# Patient Record
Sex: FEMALE
[De-identification: names, ages, dates, MRNs, and addresses within clinical notes are randomized per-mention and may not be internally consistent; named-entity substitution may affect disease eponyms.]

---

## 2019-05-16 ENCOUNTER — APPOINTMENT (OUTPATIENT)
Dept: GYNECOLOGIC ONCOLOGY | Facility: CLINIC | Age: 44
End: 2019-05-16
Payer: COMMERCIAL

## 2019-05-16 VITALS
WEIGHT: 213 LBS | OXYGEN SATURATION: 98 % | HEART RATE: 66 BPM | HEIGHT: 67 IN | SYSTOLIC BLOOD PRESSURE: 133 MMHG | BODY MASS INDEX: 33.43 KG/M2 | DIASTOLIC BLOOD PRESSURE: 73 MMHG

## 2019-05-16 DIAGNOSIS — Z78.9 OTHER SPECIFIED HEALTH STATUS: ICD-10-CM

## 2019-05-16 DIAGNOSIS — Z80.3 FAMILY HISTORY OF MALIGNANT NEOPLASM OF BREAST: ICD-10-CM

## 2019-05-16 DIAGNOSIS — Z80.41 FAMILY HISTORY OF MALIGNANT NEOPLASM OF OVARY: ICD-10-CM

## 2019-05-16 PROCEDURE — 93976 VASCULAR STUDY: CPT | Mod: 59

## 2019-05-16 PROCEDURE — 76830 TRANSVAGINAL US NON-OB: CPT | Mod: 59

## 2019-05-16 PROCEDURE — 99205 OFFICE O/P NEW HI 60 MIN: CPT | Mod: 25

## 2019-05-16 PROCEDURE — 76857 US EXAM PELVIC LIMITED: CPT | Mod: 59

## 2019-05-16 NOTE — CHIEF COMPLAINT
[FreeTextEntry1] : [facility]\par \par [facility] [phone] at [address] [place] [id number]\par

## 2019-05-16 NOTE — ASSESSMENT
[FreeTextEntry1] : 44yo with globular left ovary and pelvic pain. Complex ovarian cyst seen on US today. \par \par I discussed with the patient that due to her extensive family history she has a 33% lifetime risk of developing breast cancer which qualifies her to have breast MRI. Will also perform MRI pelvis and obtain LUX to better evaluate her ovarian cyst.

## 2019-05-16 NOTE — END OF VISIT
[FreeTextEntry3] : This note accurately reflects the work and decisions made by me.\par  [FreeTextEntry2] : Written by Yasmin Vera PA-C, acting as scribe for Dr. Vanna Davison.\par

## 2019-05-16 NOTE — HISTORY OF PRESENT ILLNESS
[FreeTextEntry1] : 42yo  LMP 21 reports with referral from Dr. Griffith for c/o pelvic pain off and on for a few weeks now. Patient with US this month revealing globular left ovary and elevated CA-125 at 60. Patient reports history of Ex-lap RO for ovarian cyst in . She reports constipation since she was a child but denies any weight loss or abnormal VB. Patient with extensive family history. Mother with breast cancer at age 44, Maternal Aunt with Breast Cancer at age 40 and sister with Ovarian Cancer at age 19. Pt. is BRCA negative. \par \par LPAP- 2019, normal\par LMammo- 2019, normal\par \par

## 2019-05-16 NOTE — REVIEW OF SYSTEMS
[Negative] : Musculoskeletal [Dysuria] : no dysuria [Hematuria] : no hematuria [Abn Vag Bleeding] : no abnormal vaginal bleeding [Dyspareunia] : no dyspareunia [Vaginal Discharge] : no vaginal discharge [Incontinence] : no incontinence [Bloody Stools] : no bloody stools [Nausea] : no nausea/vomitting [FreeTextEntry4] : left sided pelvic pain [Diarrhea] : no diarrhea [de-identified] : occasional constipation

## 2019-05-16 NOTE — LETTER BODY
[FreeTextEntry2] : Name: CAMILLE EVERETT \par : Aug 29 1975 \par \par Date of Visit: May 16, 2019 \par \par Dear Dr. Griffith\par \par I recently saw our mutual patient, CAMILLE EVERETT , in my office.\par \par Below, please see my findings.\par \par As always, it is my sincere pleasure to have the opportunity to participate in one of your patients' care. Please feel free to contact me with any questions or concerns that you may have regarding her care.\par \par Sincerely yours,\par \par Vanna Davison MD\par

## 2019-05-24 ENCOUNTER — OTHER (OUTPATIENT)
Age: 44
End: 2019-05-24

## 2019-05-28 ENCOUNTER — OTHER (OUTPATIENT)
Age: 44
End: 2019-05-28

## 2019-05-30 ENCOUNTER — APPOINTMENT (OUTPATIENT)
Dept: GYNECOLOGIC ONCOLOGY | Facility: CLINIC | Age: 44
End: 2019-05-30
Payer: COMMERCIAL

## 2019-05-30 LAB
CA 125 (LABCORP): 24.2 U/ML
HE 4: 44.5 PMOL/L
POSTMENOPAUSAL ROMA: 1.41
PREMENOPAUSAL ROMA: 0.59
ROMA COMMENT: NORMAL

## 2019-05-30 PROCEDURE — 99214 OFFICE O/P EST MOD 30 MIN: CPT

## 2019-06-03 NOTE — DISCUSSION/SUMMARY
[FreeTextEntry1] : Based on current evaluation I would not recommend surgical removal of the ovary. She is young and that would mean going into menopause. Discussed increased all cause mortality, cardiovascular mortality, dementia and osteoporosis risks associated with premature surgical menopause. Will wait for MRI results to make definitive plans.. With family history this may be beneficial to reduce her risk of breast cancer. If we are doing surgery I would recommend hysterectomy also, so she can be on estrogen only replacement therapy pending final pathology unless there is an estrogen sensitive cancer, which would make hormone replacement too high risk.

## 2019-06-03 NOTE — ASSESSMENT
[FreeTextEntry1] : 42 yo with a complex left ovarian mass and a normal LUX. She has surgically absent right ovary. High lifetime risk of breast cancer based on tyrer-cuzic test of 33%. MRI pending.

## 2019-06-03 NOTE — HISTORY OF PRESENT ILLNESS
[FreeTextEntry1] : Pt is a 42 yo with a globular left ovary and pelvic pain. She has an extensive history of breast cancer and a high life time risk of breast cancer based on Tyrer-Cuzic of 33%. She had LUX and MRI pending. She presents for discussion of LUX results. No new issues today.

## 2019-06-06 ENCOUNTER — APPOINTMENT (OUTPATIENT)
Dept: GYNECOLOGIC ONCOLOGY | Facility: CLINIC | Age: 44
End: 2019-06-06
Payer: COMMERCIAL

## 2019-06-06 VITALS
HEIGHT: 67 IN | OXYGEN SATURATION: 97 % | BODY MASS INDEX: 33.74 KG/M2 | SYSTOLIC BLOOD PRESSURE: 131 MMHG | RESPIRATION RATE: 16 BRPM | HEART RATE: 74 BPM | WEIGHT: 215 LBS | DIASTOLIC BLOOD PRESSURE: 87 MMHG

## 2019-06-06 DIAGNOSIS — R10.2 PELVIC AND PERINEAL PAIN: ICD-10-CM

## 2019-06-06 DIAGNOSIS — R92.8 OTHER ABNORMAL AND INCONCLUSIVE FINDINGS ON DIAGNOSTIC IMAGING OF BREAST: ICD-10-CM

## 2019-06-06 PROCEDURE — 99214 OFFICE O/P EST MOD 30 MIN: CPT

## 2019-06-06 NOTE — DISCUSSION/SUMMARY
[FreeTextEntry1] : Based on current evaluation I would not recommend surgical removal of the ovary. 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 The patient does report being symptomatic occasionally. Discussed risks of rupture of fallopian tube causing hemorrhage. At this time LUX and imaging is reasuring regarding her ovaries. Discussed increased all cause mortality, cardiovascular mortality, dementia and osteoporosis risks associated with premature surgical menopause. Will wait for MRI results to make definitive plans.. With family history this may be beneficial to reduce her risk of breast cancer. If we are doing surgery I would recommend hysterectomy also, so she can be on estrogen only replacement therapy pending final pathology unless there is an estrogen sensitive cancer, which would make hormone replacement too high risk. \par \par Regarding her breast MRI I would recommend follow up with targeted US and follow up with Dr. Starkey. The abnormality likely represents DCIS or LCIS. If no corelation is found on US will need MRI guided biopsy.

## 2019-06-06 NOTE — HISTORY OF PRESENT ILLNESS
[FreeTextEntry1] : Pt is a 42 yo with a globular left ovary and pelvic pain. She has an extensive history of breast cancer and a high life time risk of breast cancer based on Tyrer-Cuzic of 33%. She had LUX and MRI of pelvis. She also had a breast MRI and presents to discuss results and follow up. No new concerns.

## 2019-06-19 ENCOUNTER — APPOINTMENT (OUTPATIENT)
Dept: INTERNAL MEDICINE | Facility: CLINIC | Age: 44
End: 2019-06-19
Payer: COMMERCIAL

## 2019-06-19 VITALS
RESPIRATION RATE: 14 BRPM | HEIGHT: 67 IN | TEMPERATURE: 98.6 F | DIASTOLIC BLOOD PRESSURE: 80 MMHG | SYSTOLIC BLOOD PRESSURE: 110 MMHG | OXYGEN SATURATION: 98 % | BODY MASS INDEX: 34.21 KG/M2 | WEIGHT: 218 LBS | HEART RATE: 74 BPM

## 2019-06-19 DIAGNOSIS — R92.8 OTHER ABNORMAL AND INCONCLUSIVE FINDINGS ON DIAGNOSTIC IMAGING OF BREAST: ICD-10-CM

## 2019-06-19 DIAGNOSIS — Z82.49 FAMILY HISTORY OF ISCHEMIC HEART DISEASE AND OTHER DISEASES OF THE CIRCULATORY SYSTEM: ICD-10-CM

## 2019-06-19 PROCEDURE — 99204 OFFICE O/P NEW MOD 45 MIN: CPT

## 2019-06-19 NOTE — PHYSICAL EXAM
[No Acute Distress] : no acute distress [Well Nourished] : well nourished [Well Developed] : well developed [Normal Voice/Communication] : normal voice/communication [Well-Appearing] : well-appearing [PERRL] : pupils equal round and reactive to light [EOMI] : extraocular movements intact [Normal Sclera/Conjunctiva] : normal sclera/conjunctiva [No JVD] : no jugular venous distention [Normal Outer Ear/Nose] : the outer ears and nose were normal in appearance [Normal Oropharynx] : the oropharynx was normal [Supple] : supple [No Lymphadenopathy] : no lymphadenopathy [No Respiratory Distress] : no respiratory distress  [Thyroid Normal, No Nodules] : the thyroid was normal and there were no nodules present [Clear to Auscultation] : lungs were clear to auscultation bilaterally [Regular Rhythm] : with a regular rhythm [No Accessory Muscle Use] : no accessory muscle use [Normal Rate] : normal rate  [Normal S1, S2] : normal S1 and S2 [No Murmur] : no murmur heard [No Carotid Bruits] : no carotid bruits [No Abdominal Bruit] : a ~M bruit was not heard ~T in the abdomen [No Varicosities] : no varicosities [No Edema] : there was no peripheral edema [Pedal Pulses Present] : the pedal pulses are present [No Palpable Aorta] : no palpable aorta [No Extremity Clubbing/Cyanosis] : no extremity clubbing/cyanosis [Non Tender] : non-tender [Soft] : abdomen soft [Non-distended] : non-distended [No Masses] : no abdominal mass palpated [No HSM] : no HSM [Normal Bowel Sounds] : normal bowel sounds [Normal Anterior Cervical Nodes] : no anterior cervical lymphadenopathy [Normal Posterior Cervical Nodes] : no posterior cervical lymphadenopathy [No CVA Tenderness] : no CVA  tenderness [No Spinal Tenderness] : no spinal tenderness [No Joint Swelling] : no joint swelling [No Rash] : no rash [Normal Gait] : normal gait [Grossly Normal Strength/Tone] : grossly normal strength/tone [No Focal Deficits] : no focal deficits [Coordination Grossly Intact] : coordination grossly intact [Deep Tendon Reflexes (DTR)] : deep tendon reflexes were 2+ and symmetric [Speech Grossly Normal] : speech grossly normal [Memory Grossly Normal] : memory grossly normal [Normal Affect] : the affect was normal [Alert and Oriented x3] : oriented to person, place, and time [Normal Mood] : the mood was normal [Normal Insight/Judgement] : insight and judgment were intact

## 2019-06-20 ENCOUNTER — APPOINTMENT (OUTPATIENT)
Dept: ULTRASOUND IMAGING | Facility: CLINIC | Age: 44
End: 2019-06-20

## 2019-06-27 NOTE — ASSESSMENT
[FreeTextEntry1] : Pt is a 44 yo with a complex left ovarian mass and a normal LUX. She has surgically absent right ovary. High lifetime risk of breast cancer based on tyrer-cuzic test of 33%. MRI pending.
100

## 2019-07-05 ENCOUNTER — MOBILE ON CALL (OUTPATIENT)
Age: 44
End: 2019-07-05

## 2019-07-12 ENCOUNTER — OTHER (OUTPATIENT)
Age: 44
End: 2019-07-12

## 2019-08-15 ENCOUNTER — APPOINTMENT (OUTPATIENT)
Dept: GYNECOLOGIC ONCOLOGY | Facility: CLINIC | Age: 44
End: 2019-08-15
Payer: COMMERCIAL

## 2019-08-15 ENCOUNTER — APPOINTMENT (OUTPATIENT)
Dept: GYNECOLOGIC ONCOLOGY | Facility: CLINIC | Age: 44
End: 2019-08-15

## 2019-08-15 DIAGNOSIS — N83.202 UNSPECIFIED OVARIAN CYST, LEFT SIDE: ICD-10-CM

## 2019-08-15 PROCEDURE — 99214 OFFICE O/P EST MOD 30 MIN: CPT | Mod: 25

## 2019-08-15 PROCEDURE — 93976 VASCULAR STUDY: CPT | Mod: 59

## 2019-08-15 PROCEDURE — 76857 US EXAM PELVIC LIMITED: CPT | Mod: 59

## 2019-08-15 PROCEDURE — 76830 TRANSVAGINAL US NON-OB: CPT | Mod: 59

## 2019-08-15 NOTE — HISTORY OF PRESENT ILLNESS
[FreeTextEntry1] : Pt is a 42 yo with a globular left ovary and pelvic pain. She has an extensive history of breast cancer and a high life time risk of breast cancer based on Tyrer-Cuzic of 33%. She had LUX and MRI of pelvis. She also had a breast MRI and presents to discuss results and follow up. No new concerns. \par \par She is here for follow up on left ovarian cyst.

## 2019-08-15 NOTE — DISCUSSION/SUMMARY
[FreeTextEntry1] : Continue to follow the 2 cm left dominant follicle. Will follow up with US in 6 months and try to time it around the time of her menstrual cycle to see if it gone at that time. \par \par Negative MRI guided biopsy. Continue follow up with Dr. Starkey.

## 2019-10-28 ENCOUNTER — APPOINTMENT (OUTPATIENT)
Dept: SURGERY | Facility: CLINIC | Age: 44
End: 2019-10-28
Payer: COMMERCIAL

## 2019-10-28 VITALS
HEIGHT: 67 IN | DIASTOLIC BLOOD PRESSURE: 86 MMHG | SYSTOLIC BLOOD PRESSURE: 130 MMHG | WEIGHT: 223 LBS | BODY MASS INDEX: 35 KG/M2 | HEART RATE: 73 BPM

## 2019-10-28 DIAGNOSIS — Z12.39 ENCOUNTER FOR OTHER SCREENING FOR MALIGNANT NEOPLASM OF BREAST: ICD-10-CM

## 2019-10-28 DIAGNOSIS — Z78.9 OTHER SPECIFIED HEALTH STATUS: ICD-10-CM

## 2019-10-28 DIAGNOSIS — Z80.8 FAMILY HISTORY OF MALIGNANT NEOPLASM OF OTHER ORGANS OR SYSTEMS: ICD-10-CM

## 2019-10-28 DIAGNOSIS — R92.8 OTHER ABNORMAL AND INCONCLUSIVE FINDINGS ON DIAGNOSTIC IMAGING OF BREAST: ICD-10-CM

## 2019-10-28 PROCEDURE — 99215 OFFICE O/P EST HI 40 MIN: CPT

## 2019-10-28 NOTE — ASSESSMENT
[FreeTextEntry1] : 45 yo premenopausal female with elevated lifetime risk and recent abnormal breast imaging presents for clinical breast evaluation.  She recently underwent MRI of the breast which demonstrated a 1.4 cm enhancing area within the left breast. US did not demonstrate a correlate and MR biopsy demonstrated fibrocystic disease. \par 1. Internal review of imaging to determine concordance\par 2. Potential biopsy after review of imaging

## 2019-10-28 NOTE — HISTORY OF PRESENT ILLNESS
[FreeTextEntry1] : I had the pleasure of seeing Tatyana Otto in the office for a new visit breast evaluation secondary to elevated lifetime risk and recent abnormal breast imaging.\par \par Tatyana is a yosvany 43 yo premenopausal female who has an extensive family history of breast cancer.  She denies dominant breast mass, skin changes or nipple discharge.  She recently underwent abnormal breast imaging demonstrating left breast 1.4cm nodule.  Biopsy was recommended. Second look ultrasound did not demonstrate the nodule and MR guided biopsy demonstrated benign fibrocystic findings.\par \par She denies a personal history of cancer.  Family history includes sister dx with ovarian cancer at age 19 and mother dx with breast cancer at the age of 44 and MAunt dx with breast cancer at age 40.\par \par She is . Patient reports that she was tested for BRCA 20 years ago but denies recent genetic testing.\par \par Imaging: [facility]\par 2019 Mammo post Bx left\par Impression: visualization of marker clip (V shaped) from MRI guided core needle biopsy. \par 2019 Left breast ultrasound\par Impression: No suspicious or solid lesion to correspond to the area of irregular MRI enhancement lower inner left breast. MRI guided biopsy is recommended in further evaluation at this time. BIRADS 4B\par \par We reviewed clinical examination, MRI and genetic testing.  She understands that genetic testing has been advanced and updated. \par \par In addition, I have recommended internal review of imaging with possible plan for biopsy if not concordant. \par \par She understands and agrees to the plan.\par All questions were answered. \par   \par \par \par \par

## 2019-10-28 NOTE — PHYSICAL EXAM
[Normocephalic] : normocephalic [Atraumatic] : atraumatic [EOMI] : extra ocular movement intact [PERRL] : pupils equal, round and reactive to light [Sclera nonicteric] : sclera nonicteric [Supple] : supple [No Supraclavicular Adenopathy] : no supraclavicular adenopathy [Examined in the supine and seated position] : examined in the supine and seated position [No dominant masses] : no dominant masses in right breast  [No dominant masses] : no dominant masses left breast [No Nipple Retraction] : no left nipple retraction [No Nipple Discharge] : no left nipple discharge [No Axillary Lymphadenopathy] : no left axillary lymphadenopathy [No Edema] : no edema [No Rashes] : no rashes [No Ulceration] : no ulceration [de-identified] : No supraclavicular or axillary adenopathy. No dominant masses, normal to palpation. Everted nipple without discharge. No skin changes.  [de-identified] : No supraclavicular or axillary adenopathy. No dominant masses, normal to palpation. Everted nipple without discharge. No skin changes.

## 2019-11-19 ENCOUNTER — APPOINTMENT (OUTPATIENT)
Dept: SURGERY | Facility: CLINIC | Age: 44
End: 2019-11-19

## 2020-01-29 ENCOUNTER — APPOINTMENT (OUTPATIENT)
Dept: MRI IMAGING | Facility: CLINIC | Age: 45
End: 2020-01-29
Payer: COMMERCIAL

## 2020-01-29 ENCOUNTER — OUTPATIENT (OUTPATIENT)
Dept: OUTPATIENT SERVICES | Facility: HOSPITAL | Age: 45
LOS: 1 days | End: 2020-01-29
Payer: COMMERCIAL

## 2020-01-29 DIAGNOSIS — R92.8 OTHER ABNORMAL AND INCONCLUSIVE FINDINGS ON DIAGNOSTIC IMAGING OF BREAST: ICD-10-CM

## 2020-01-29 DIAGNOSIS — Z98.89 OTHER SPECIFIED POSTPROCEDURAL STATES: Chronic | ICD-10-CM

## 2020-01-29 PROCEDURE — A9585: CPT

## 2020-01-29 PROCEDURE — 77049 MRI BREAST C-+ W/CAD BI: CPT | Mod: 26

## 2020-01-29 PROCEDURE — C8908: CPT

## 2020-01-29 PROCEDURE — C8937: CPT

## 2021-02-11 ENCOUNTER — APPOINTMENT (OUTPATIENT)
Dept: SURGERY | Facility: CLINIC | Age: 46
End: 2021-02-11

## 2021-05-20 ENCOUNTER — APPOINTMENT (OUTPATIENT)
Dept: SURGERY | Facility: CLINIC | Age: 46
End: 2021-05-20

## 2021-05-27 ENCOUNTER — APPOINTMENT (OUTPATIENT)
Dept: BREAST CENTER | Facility: CLINIC | Age: 46
End: 2021-05-27
Payer: COMMERCIAL

## 2021-05-27 VITALS
DIASTOLIC BLOOD PRESSURE: 99 MMHG | HEIGHT: 67 IN | WEIGHT: 225 LBS | SYSTOLIC BLOOD PRESSURE: 136 MMHG | OXYGEN SATURATION: 98 % | HEART RATE: 88 BPM | BODY MASS INDEX: 35.31 KG/M2 | TEMPERATURE: 98 F

## 2021-05-27 DIAGNOSIS — Z12.39 ENCOUNTER FOR OTHER SCREENING FOR MALIGNANT NEOPLASM OF BREAST: ICD-10-CM

## 2021-05-27 PROCEDURE — 99213 OFFICE O/P EST LOW 20 MIN: CPT

## 2021-05-27 NOTE — HISTORY OF PRESENT ILLNESS
[FreeTextEntry1] : I had the pleasure of seeing Tatyana Otto in the office for a follow up breast evaluation secondary to elevated lifetime risk.\par \par Tatyana is a yosvany 44 yo premenopausal female who has an extensive family history of breast cancer.  She denies dominant breast mass, skin changes or nipple discharge.  She recently underwent abnormal breast imaging demonstrating left breast 1.4 cm nodule.  Biopsy was recommended. Second look ultrasound did not demonstrate the nodule and MR guided biopsy demonstrated benign fibrocystic findings.\par \par She denies a personal history of cancer.  Family history includes sister dx with ovarian cancer at age 19 and mother dx with breast cancer at the age of 44 and MAunt dx with breast cancer at age 40.\par \par She is . \par [facility] Genetic Results- No clinically significant mutation identified\par \par \par Imaging: [facility]\par 2019 Mammo post Bx left\par Impression:Fibrocystic change\par \par 6/3/2020  mammogram screening\par Impression:  No mammographic evidence of malignancy.  BIRADS 1 negative\par \par 6/3/2020  Breast ultrasound bilateral complete\par Impression:  No suspicious findings.  BIRADS 2 benign\par \par We reviewed clinical examination and clinical exam is benign today.  Recommendation for screening mammogram/sonogram next month and follow up in 6 months if imaging is stable and benign.  \par \par \par She understands and agrees to the plan.\par All questions were answered. \par   \par \par \par \par

## 2021-05-27 NOTE — PHYSICAL EXAM
[Normocephalic] : normocephalic [Atraumatic] : atraumatic [EOMI] : extra ocular movement intact [PERRL] : pupils equal, round and reactive to light [Sclera nonicteric] : sclera nonicteric [Supple] : supple [No Supraclavicular Adenopathy] : no supraclavicular adenopathy [Examined in the supine and seated position] : examined in the supine and seated position [No dominant masses] : no dominant masses in right breast  [No dominant masses] : no dominant masses left breast [No Nipple Retraction] : no left nipple retraction [No Nipple Discharge] : no left nipple discharge [No Axillary Lymphadenopathy] : no left axillary lymphadenopathy [No Edema] : no edema [No Rashes] : no rashes [No Ulceration] : no ulceration [de-identified] : No supraclavicular or axillary adenopathy. No dominant masses, normal to palpation. Everted nipple without discharge. No skin changes.  [de-identified] : No supraclavicular or axillary adenopathy. No dominant masses, normal to palpation. Everted nipple without discharge. No skin changes.

## 2021-05-27 NOTE — ASSESSMENT
[FreeTextEntry1] : 44 yo premenopausal female with elevated lifetime risk and recent abnormal breast imaging presents for clinical breast evaluation.  Clinical breast exam is benign.  Recommendation for screening mammogram/sonogram next month and if benign then follow up in 6 months.\par 1. Screening mammogram/sonogram due June 2021\par 2. Follow up in 6 months

## 2021-05-29 ENCOUNTER — EMERGENCY (EMERGENCY)
Facility: HOSPITAL | Age: 46
LOS: 1 days | Discharge: HOME | End: 2021-05-29
Attending: EMERGENCY MEDICINE | Admitting: EMERGENCY MEDICINE
Payer: COMMERCIAL

## 2021-05-29 VITALS
OXYGEN SATURATION: 98 % | RESPIRATION RATE: 18 BRPM | HEART RATE: 77 BPM | TEMPERATURE: 98 F | SYSTOLIC BLOOD PRESSURE: 138 MMHG | HEIGHT: 67 IN | WEIGHT: 225.09 LBS | DIASTOLIC BLOOD PRESSURE: 86 MMHG

## 2021-05-29 VITALS
SYSTOLIC BLOOD PRESSURE: 123 MMHG | RESPIRATION RATE: 16 BRPM | DIASTOLIC BLOOD PRESSURE: 68 MMHG | OXYGEN SATURATION: 98 % | HEART RATE: 70 BPM

## 2021-05-29 DIAGNOSIS — Z90.49 ACQUIRED ABSENCE OF OTHER SPECIFIED PARTS OF DIGESTIVE TRACT: Chronic | ICD-10-CM

## 2021-05-29 DIAGNOSIS — Z98.89 OTHER SPECIFIED POSTPROCEDURAL STATES: Chronic | ICD-10-CM

## 2021-05-29 LAB
ALBUMIN SERPL ELPH-MCNC: 4.1 G/DL — SIGNIFICANT CHANGE UP (ref 3.3–5)
ALP SERPL-CCNC: 78 U/L — SIGNIFICANT CHANGE UP (ref 40–120)
ALT FLD-CCNC: 54 U/L — SIGNIFICANT CHANGE UP (ref 12–78)
ANION GAP SERPL CALC-SCNC: 8 MMOL/L — SIGNIFICANT CHANGE UP (ref 5–17)
AST SERPL-CCNC: 37 U/L — SIGNIFICANT CHANGE UP (ref 15–37)
BASOPHILS # BLD AUTO: 0.03 K/UL — SIGNIFICANT CHANGE UP (ref 0–0.2)
BASOPHILS NFR BLD AUTO: 0.5 % — SIGNIFICANT CHANGE UP (ref 0–2)
BILIRUB SERPL-MCNC: 1.2 MG/DL — SIGNIFICANT CHANGE UP (ref 0.2–1.2)
BUN SERPL-MCNC: 15 MG/DL — SIGNIFICANT CHANGE UP (ref 7–23)
CALCIUM SERPL-MCNC: 9.3 MG/DL — SIGNIFICANT CHANGE UP (ref 8.5–10.1)
CHLORIDE SERPL-SCNC: 105 MMOL/L — SIGNIFICANT CHANGE UP (ref 96–108)
CO2 SERPL-SCNC: 27 MMOL/L — SIGNIFICANT CHANGE UP (ref 22–31)
CREAT SERPL-MCNC: 0.82 MG/DL — SIGNIFICANT CHANGE UP (ref 0.5–1.3)
D DIMER BLD IA.RAPID-MCNC: <150 NG/ML DDU — SIGNIFICANT CHANGE UP
EOSINOPHIL # BLD AUTO: 0.28 K/UL — SIGNIFICANT CHANGE UP (ref 0–0.5)
EOSINOPHIL NFR BLD AUTO: 4.7 % — SIGNIFICANT CHANGE UP (ref 0–6)
GLUCOSE SERPL-MCNC: 89 MG/DL — SIGNIFICANT CHANGE UP (ref 70–99)
HCG SERPL-ACNC: 2 MIU/ML — SIGNIFICANT CHANGE UP
HCT VFR BLD CALC: 42.9 % — SIGNIFICANT CHANGE UP (ref 34.5–45)
HGB BLD-MCNC: 14.1 G/DL — SIGNIFICANT CHANGE UP (ref 11.5–15.5)
IMM GRANULOCYTES NFR BLD AUTO: 0.3 % — SIGNIFICANT CHANGE UP (ref 0–1.5)
LYMPHOCYTES # BLD AUTO: 1.92 K/UL — SIGNIFICANT CHANGE UP (ref 1–3.3)
LYMPHOCYTES # BLD AUTO: 32 % — SIGNIFICANT CHANGE UP (ref 13–44)
MCHC RBC-ENTMCNC: 27.5 PG — SIGNIFICANT CHANGE UP (ref 27–34)
MCHC RBC-ENTMCNC: 32.9 GM/DL — SIGNIFICANT CHANGE UP (ref 32–36)
MCV RBC AUTO: 83.8 FL — SIGNIFICANT CHANGE UP (ref 80–100)
MONOCYTES # BLD AUTO: 0.45 K/UL — SIGNIFICANT CHANGE UP (ref 0–0.9)
MONOCYTES NFR BLD AUTO: 7.5 % — SIGNIFICANT CHANGE UP (ref 2–14)
NEUTROPHILS # BLD AUTO: 3.3 K/UL — SIGNIFICANT CHANGE UP (ref 1.8–7.4)
NEUTROPHILS NFR BLD AUTO: 55 % — SIGNIFICANT CHANGE UP (ref 43–77)
NRBC # BLD: 0 /100 WBCS — SIGNIFICANT CHANGE UP (ref 0–0)
PLATELET # BLD AUTO: 258 K/UL — SIGNIFICANT CHANGE UP (ref 150–400)
POTASSIUM SERPL-MCNC: 3.7 MMOL/L — SIGNIFICANT CHANGE UP (ref 3.5–5.3)
POTASSIUM SERPL-SCNC: 3.7 MMOL/L — SIGNIFICANT CHANGE UP (ref 3.5–5.3)
PROT SERPL-MCNC: 8 G/DL — SIGNIFICANT CHANGE UP (ref 6–8.3)
RBC # BLD: 5.12 M/UL — SIGNIFICANT CHANGE UP (ref 3.8–5.2)
RBC # FLD: 12.1 % — SIGNIFICANT CHANGE UP (ref 10.3–14.5)
SARS-COV-2 RNA SPEC QL NAA+PROBE: SIGNIFICANT CHANGE UP
SODIUM SERPL-SCNC: 140 MMOL/L — SIGNIFICANT CHANGE UP (ref 135–145)
TROPONIN I SERPL-MCNC: <.015 NG/ML — SIGNIFICANT CHANGE UP (ref 0.01–0.04)
TROPONIN I SERPL-MCNC: <.015 NG/ML — SIGNIFICANT CHANGE UP (ref 0.01–0.04)
TSH SERPL-MCNC: 0.41 UIU/ML — SIGNIFICANT CHANGE UP (ref 0.36–3.74)
WBC # BLD: 6 K/UL — SIGNIFICANT CHANGE UP (ref 3.8–10.5)
WBC # FLD AUTO: 6 K/UL — SIGNIFICANT CHANGE UP (ref 3.8–10.5)

## 2021-05-29 PROCEDURE — 80053 COMPREHEN METABOLIC PANEL: CPT

## 2021-05-29 PROCEDURE — 99285 EMERGENCY DEPT VISIT HI MDM: CPT

## 2021-05-29 PROCEDURE — 85379 FIBRIN DEGRADATION QUANT: CPT

## 2021-05-29 PROCEDURE — U0005: CPT

## 2021-05-29 PROCEDURE — U0003: CPT

## 2021-05-29 PROCEDURE — 84443 ASSAY THYROID STIM HORMONE: CPT

## 2021-05-29 PROCEDURE — 85025 COMPLETE CBC W/AUTO DIFF WBC: CPT

## 2021-05-29 PROCEDURE — 99284 EMERGENCY DEPT VISIT MOD MDM: CPT | Mod: 25

## 2021-05-29 PROCEDURE — 71046 X-RAY EXAM CHEST 2 VIEWS: CPT

## 2021-05-29 PROCEDURE — 83690 ASSAY OF LIPASE: CPT

## 2021-05-29 PROCEDURE — 84702 CHORIONIC GONADOTROPIN TEST: CPT

## 2021-05-29 PROCEDURE — 93005 ELECTROCARDIOGRAM TRACING: CPT

## 2021-05-29 PROCEDURE — 71046 X-RAY EXAM CHEST 2 VIEWS: CPT | Mod: 26

## 2021-05-29 PROCEDURE — 36415 COLL VENOUS BLD VENIPUNCTURE: CPT

## 2021-05-29 PROCEDURE — 93010 ELECTROCARDIOGRAM REPORT: CPT | Mod: 76

## 2021-05-29 PROCEDURE — 84484 ASSAY OF TROPONIN QUANT: CPT

## 2021-05-29 NOTE — ED ADULT NURSE NOTE - HOW OFTEN DO YOU HAVE A DRINK CONTAINING ALCOHOL?
[General Appearance - Alert] : alert [General Appearance - In No Acute Distress] : in no acute distress [PERRL With Normal Accommodation] : pupils were equal in size, round, and reactive to light [Extraocular Movements] : extraocular movements were intact [Sclera] : the sclera and conjunctiva were normal [Oropharynx] : the oropharynx was normal [Outer Ear] : the ears and nose were normal in appearance [Neck Cervical Mass (___cm)] : no neck mass was observed [Neck Appearance] : the appearance of the neck was normal [Jugular Venous Distention Increased] : there was no jugular-venous distention [Thyroid Nodule] : there were no palpable thyroid nodules [Heart Rate And Rhythm] : heart rate was normal and rhythm regular [Auscultation Breath Sounds / Voice Sounds] : lungs were clear to auscultation bilaterally [Heart Sounds] : normal S1 and S2 [Heart Sounds Gallop] : no gallops [Murmurs] : no murmurs [Heart Sounds Pericardial Friction Rub] : no pericardial rub [Full Pulse] : the pedal pulses are present [Bowel Sounds] : normal bowel sounds [Edema] : there was no peripheral edema [Abdomen Soft] : soft [Abdomen Tenderness] : non-tender [Cervical Lymph Nodes Enlarged Posterior Bilaterally] : posterior cervical [Cervical Lymph Nodes Enlarged Anterior Bilaterally] : anterior cervical [Supraclavicular Lymph Nodes Enlarged Bilaterally] : supraclavicular [Axillary Lymph Nodes Enlarged Bilaterally] : axillary [Inguinal Lymph Nodes Enlarged Bilaterally] : inguinal [Involuntary Movements] : no involuntary movements were seen [___ (cm) Fistula] : [unfilled] (cm) fistula [Bruit] : a bruit was present [Thrill] : a thrill was present [] : no rash [No Focal Deficits] : no focal deficits [FreeTextEntry1] : tremors+ [Oriented To Time, Place, And Person] : oriented to person, place, and time [Impaired Insight] : insight and judgment were intact [Affect] : the affect was normal Monthly or less

## 2021-05-29 NOTE — ED PROVIDER NOTE - OBJECTIVE STATEMENT
44 yo F PMHx endometriosis, vertigo presents to ED c/o palpitations x a few days. Pt states symptoms started over a week ago, with feeling of dizziness/light-headedness, consistent with her vertigo. But reports dizziness has resolved and now with intermittent "fluttering" sensation in chest x a few days. Pt went to  to evaluate seasonal allergies and dry cough x 2 days but mentioned her palpitations and so they did an EKG and sent her in for further evaluation and to r/o PE, given +FMHx of PE. Pt currently asymptomatic. Pt denies fever/chills, back pain, chest pain, SOB, abd pain, N/V/D, fever/chills, recent travel, calf pain/swelling. Pt states she is on OCPs.

## 2021-05-29 NOTE — ED PROVIDER NOTE - PROGRESS NOTE DETAILS
patient signed out to me by Karla GONZALEZ. PT now complaining of mid sternal chest pain raiating to R chest under R breast. States the pain is a dull sensation rated 5 out fo 10. Denies SOB and "fluttering" sensation in chest. On exam pt in NAD, lungs CTA, abd soft NTND,  negative Mae's signs. Chest pain not able to be reproduced with palpation. RRR, HR 80s on cardiac monitor. All results reviewed with pt thus far. Will repeat EKG and send a second troponin  in 4 hours. Pt aware and agreeable with plan. Pt re-evaluated at bedside. Found talking on phone in NAD. Patient reports still feeling chest pain but not worsening. Offered pain medication but refused, VSS. Awaiting repeat troponin at 1930 Second troponin negative, pt feeling improved. Sensation has minimized. No palpations of SOB. All results reviewed. Copy of results provided for pt on discharge. Pt admits has been stressed with work and home life discussed possibility  this could be stress related. Advised stress management. Pt adivsed to follow up with cardiology and if symptoms return or worsen to return to ED.

## 2021-05-29 NOTE — ED PROVIDER NOTE - CARE PROVIDER_API CALL
Panfilo Cisse (MD)  Cardiovascular Disease; Internal Medicine  [address] [facility]  [place] [address]  Phone: [phone]  Fax: [phone]  Follow Up Time: 1-3 Days

## 2021-05-29 NOTE — ED PROVIDER NOTE - PATIENT PORTAL LINK FT
You can access the FollowMyHealth Patient Portal offered by [facility] by registering at the following website: [email or web address]. By joining [employer]’s FollowMyHealth portal, you will also be able to view your health information using other applications (apps) compatible with our system.

## 2021-05-29 NOTE — ED PROVIDER NOTE - ATTENDING CONTRIBUTION TO CARE
46 yo F p/w was having some recent dizziness / vertigo - of which pt has a hx of. pt with some assoc palpitations for past several days. Some assoc nasal congestion. no fall / recent trauma. no neck / back pain. no numb/ting/focal weak. no chest pain. no SOB. no dizziness / weakness. No near syncope / syncope. no agg/allev factors. no other inj or co.  exam: MM Moist. neck supple. no meningeal signs. abd soft NT. no hsm . no cvat. non-toxic, well appearing. abd soft NT. nl resp effort. n w/r/r. nl cardiac exam. no c/c/e. No calf tend. No other acute co.   pts father had PE - unk cause, pt seen at urgent care - sent to ed ro PE  check labs, dimer 46 yo F p/w was having some recent dizziness / vertigo - of which pt has a hx of. pt with some assoc palpitations for past several days. Some assoc nasal congestion. no fall / recent trauma. no neck / back pain. no numb/ting/focal weak. no chest pain. no SOB. no dizziness / weakness. No near syncope / syncope. no agg/allev factors. no other inj or co.  exam: MM Moist. neck supple. no meningeal signs. abd soft NT. no hsm . no cvat. non-toxic, well appearing. abd soft NT. nl resp effort. n w/r/r. nl cardiac exam. no c/c/e. No calf tend. No other acute co.   pts father had PE - unk cause, pt seen at urgent care - sent to ed ro PE  check labs, dimer. Outpt EKG - NSR, no acute

## 2021-05-29 NOTE — ED ADULT NURSE NOTE - OBJECTIVE STATEMENT
received pt in bed #16a Pt A&O c/o chest flutters on/off x 2 days, denies any fluttering @ this time.... feeling lightheaded "it felt like my vertigo" x 1 week.....Seen @ urgent care for allergies & cough but was sent here for abnormal EKG received pt in bed #16a Pt A&O c/o chest flutters on/off x 2 days, denies any fluttering @ this time.... feeling lightheaded "it felt like my vertigo" x 1 week.....Seen @ urgent care for allergies & cough but was sent here for abnormal EKG CM placed w/ SR EKG done

## 2021-05-29 NOTE — ED PROVIDER NOTE - NSFOLLOWUPINSTRUCTIONS_ED_ALL_ED_FT
Chest Pain    Chest pain can be caused by many different conditions which may or may not be dangerous. Causes include heartburn, lung infections, heart attack, blood clot in lungs, skin infections, strain or damage to muscle, cartilage, or bones, etc. In addition to a history and physical examination, an electrocardiogram (ECG) or other lab tests may have been performed to determine the cause of your chest pain. Follow up with your primary care provider or with a cardiologist as instructed.     SEEK IMMEDIATE MEDICAL CARE IF YOU HAVE ANY OF THE FOLLOWING SYMPTOMS: worsening chest pain, coughing up blood, unexplained back/neck/jaw pain, severe abdominal pain, dizziness or lightheadedness, fainting, shortness of breath, sweaty or clammy skin, vomiting, or racing heart beat. These symptoms may represent a serious problem that is an emergency. Do not wait to see if the symptoms will go away. Get medical help right away. Call 911 and do not drive yourself to the hospital.      Palpitations    A palpitation is the feeling that your heartbeat is irregular or is faster than normal. It may feel like your heart is fluttering or skipping a beat. They may be caused by many things, including smoking, caffeine, alcohol, stress, and certain medicines. Although most causes of palpitations are not serious, palpitations can be a sign of a serious medical problem. Avoid caffeine, alcohol, and tobacco products at home. Try to reduce stress and anxiety and make sure to get plenty of rest.     SEEK IMMEDIATE MEDICAL CARE IF YOU HAVE ANY OF THE FOLLOWING SYMPTOMS: chest pain, shortness of breath, severe headache, dizziness/lightheadedness, or fainting.        1. TAKE ALL MEDICATIONS AS DIRECTED.  REST. STAY WELL HYDRATED. FOR PAIN YOU CAN TAKE IBUPROFEN (MOTRIN, ADVIL) OR ACETAMINOPHEN (TYLENOL) AS NEEDED, AS DIRECTED ON PACKAGING.  2. FOLLOW UP WITH __CARDIOLOGY________ AS DIRECTED.  YOU WERE GIVEN COPIES OF ALL LABS AND IMAGING RESULTS FROM YOUR ER VISIT--PLEASE TAKE THEM WITH YOU TO YOUR APPOINTMENT.  3. IF NEEDED, CALL [phone] TO FIND A PRIMARY CARE PHYSICIAN.  OR CALL [phone] TO MAKE AN APPOINTMENT WITH THE MEDICAL CLINIC.  4. RETURN TO THE ER FOR ANY WORSENING SYMPTOMS.        Mindfulness-Based Stress Reduction      Mindfulness-based stress reduction (MBSR) is a program that helps people learn to practice mindfulness. Mindfulness is the practice of intentionally paying attention to the present moment. It can be learned and practiced through techniques such as education, breathing exercises, meditation, and yoga. MBSR includes several mindfulness techniques in one program.  MBSR works best when you understand the treatment, are willing to try new things, and can commit to spending time practicing what you learn. MBSR training may include learning about:  •How your emotions, thoughts, and reactions affect your body.      •New ways to respond to things that cause negative thoughts to start (triggers).      •How to notice your thoughts and let go of them.      •Practicing awareness of everyday things that you normally do without thinking.      •The techniques and goals of different types of meditation.        What are the benefits of MBSR?  MBSR can have many benefits, which include helping you to:  •Develop self-awareness. This refers to knowing and understanding yourself.      •Learn skills and attitudes that help you to participate in your own health care.      •Learn new ways to care for yourself.      •Be more accepting about how things are, and let things go.      •Be less judgmental and approach things with an open mind.      •Be patient with yourself and trust yourself more.    MBSR has also been shown to:  •Reduce negative emotions, such as depression and anxiety.      •Improve memory and focus.      •Change how you sense and approach pain.      •Boost your body's ability to fight infections.      •Help you connect better with other people.      •Improve your sense of well-being.        Follow these instructions at home:     •Find a local in-person or online MBSR program.      •Set aside some time regularly for mindfulness practice.    •Find a mindfulness practice that works best for you. This may include one or more of the following:  •Meditation. Meditation involves focusing your mind on a certain thought or activity.      •Breathing awareness exercises. These help you to stay present by focusing on your breath.      •Body scan. For this practice, you lie down and pay attention to each part of your body from head to toe. You can identify tension and soreness and intentionally relax parts of your body.      •Yoga. Yoga involves stretching and breathing, and it can improve your ability to move and be flexible. It can also provide an experience of testing your body's limits, which can help you release stress.      •Mindful eating. This way of eating involves focusing on the taste, texture, color, and smell of each bite of food. Because this slows down eating and helps you feel full sooner, it can be an important part of a weight-loss plan.        •Find a podcast or recording that provides guidance for breathing awareness, body scan, or meditation exercises. You can listen to these any time when you have a free moment to rest without distractions.      •Follow your treatment plan as told by your health care provider. This may include taking regular medicines and making changes to your diet or lifestyle as recommended.        How to practice mindfulness  To do a basic awareness exercise:  •Find a comfortable place to sit.      •Pay attention to the present moment. Observe your thoughts, feelings, and surroundings just as they are.      •Avoid placing judgment on yourself, your feelings, or your surroundings. Make note of any judgment that comes up, and let it go.      •Your mind may wander, and that is okay. Make note of when your thoughts drift, and return your attention to the present moment.    To do basic mindfulness meditation:•Find a comfortable place to sit. This may include a stable chair or a firm floor cushion.  •Sit upright with your back straight. Let your arms fall next to your side with your hands resting on your legs.      •If sitting in a chair, rest your feet flat on the floor.       •If sitting on a cushion, cross your legs in front of you.         •Keep your head in a neutral position with your chin dropped slightly. Relax your jaw and rest the tip of your tongue on the roof of your mouth. Drop your gaze to the floor. You can close your eyes if you like.      •Breathe normally and pay attention to your breath. Feel the air moving in and out of your nose. Feel your belly expanding and relaxing with each breath.      •Your mind may wander, and that is okay. Make note of when your thoughts drift, and return your attention to your breath.      •Avoid placing judgment on yourself, your feelings, or your surroundings. Make note of any judgment or feelings that come up, let them go, and bring your attention back to your breath.      •When you are ready, lift your gaze or open your eyes. Pay attention to how your body feels after the meditation.        Where to find more information  You can find more information about MBSR from:  •Your health care provider.      •Community-based meditation centers or programs.      •Programs offered near you.        Summary    •Mindfulness-based stress reduction (MBSR) is a program that teaches you how to intentionally pay attention to the present moment. It is used with other treatments to help you cope better with daily stress, emotions, and pain.      •MBSR focuses on developing self-awareness, which allows you to respond to life stress without judgment or negative emotions.      •MBSR programs may involve learning different mindfulness practices, such as breathing exercises, meditation, yoga, body scan, or mindful eating. Find a mindfulness practice that works best for you, and set aside time for it on a regular basis.      This information is not intended to replace advice given to you by your health care provider. Make sure you discuss any questions you have with your health care provider.
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
## 2021-09-19 ENCOUNTER — NON-APPOINTMENT (OUTPATIENT)
Age: 46
End: 2021-09-19

## 2021-09-21 ENCOUNTER — APPOINTMENT (OUTPATIENT)
Dept: CARDIOLOGY | Facility: CLINIC | Age: 46
End: 2021-09-21
Payer: COMMERCIAL

## 2021-09-21 ENCOUNTER — NON-APPOINTMENT (OUTPATIENT)
Age: 46
End: 2021-09-21

## 2021-09-21 VITALS
HEIGHT: 67 IN | HEART RATE: 68 BPM | BODY MASS INDEX: 35.94 KG/M2 | WEIGHT: 229 LBS | SYSTOLIC BLOOD PRESSURE: 118 MMHG | OXYGEN SATURATION: 99 % | DIASTOLIC BLOOD PRESSURE: 75 MMHG

## 2021-09-21 DIAGNOSIS — Z87.891 PERSONAL HISTORY OF NICOTINE DEPENDENCE: ICD-10-CM

## 2021-09-21 DIAGNOSIS — Z82.49 FAMILY HISTORY OF ISCHEMIC HEART DISEASE AND OTHER DISEASES OF THE CIRCULATORY SYSTEM: ICD-10-CM

## 2021-09-21 DIAGNOSIS — I49.8 OTHER SPECIFIED CARDIAC ARRHYTHMIAS: ICD-10-CM

## 2021-09-21 DIAGNOSIS — R06.00 DYSPNEA, UNSPECIFIED: ICD-10-CM

## 2021-09-21 DIAGNOSIS — R00.2 PALPITATIONS: ICD-10-CM

## 2021-09-21 PROCEDURE — 99204 OFFICE O/P NEW MOD 45 MIN: CPT

## 2021-09-21 PROCEDURE — 93000 ELECTROCARDIOGRAM COMPLETE: CPT

## 2021-10-01 ENCOUNTER — APPOINTMENT (OUTPATIENT)
Dept: CARDIOLOGY | Facility: CLINIC | Age: 46
End: 2021-10-01
Payer: COMMERCIAL

## 2021-10-01 PROCEDURE — 93306 TTE W/DOPPLER COMPLETE: CPT

## 2021-10-01 PROCEDURE — 93015 CV STRESS TEST SUPVJ I&R: CPT

## 2022-03-16 ENCOUNTER — OUTPATIENT (OUTPATIENT)
Dept: OUTPATIENT SERVICES | Facility: HOSPITAL | Age: 47
LOS: 1 days | End: 2022-03-16
Payer: COMMERCIAL

## 2022-03-16 ENCOUNTER — APPOINTMENT (OUTPATIENT)
Dept: MRI IMAGING | Facility: CLINIC | Age: 47
End: 2022-03-16
Payer: COMMERCIAL

## 2022-03-16 DIAGNOSIS — Z98.89 OTHER SPECIFIED POSTPROCEDURAL STATES: Chronic | ICD-10-CM

## 2022-03-16 DIAGNOSIS — Z00.8 ENCOUNTER FOR OTHER GENERAL EXAMINATION: ICD-10-CM

## 2022-03-16 DIAGNOSIS — Z90.49 ACQUIRED ABSENCE OF OTHER SPECIFIED PARTS OF DIGESTIVE TRACT: Chronic | ICD-10-CM

## 2022-03-16 PROCEDURE — C8908: CPT

## 2022-03-16 PROCEDURE — 77049 MRI BREAST C-+ W/CAD BI: CPT | Mod: 26

## 2022-03-16 PROCEDURE — A9585: CPT

## 2022-03-16 PROCEDURE — C8937: CPT

## 2022-10-06 ENCOUNTER — APPOINTMENT (OUTPATIENT)
Dept: SURGERY | Facility: CLINIC | Age: 47
End: 2022-10-06

## 2023-06-26 ENCOUNTER — NON-APPOINTMENT (OUTPATIENT)
Age: 48
End: 2023-06-26

## 2023-06-27 ENCOUNTER — APPOINTMENT (OUTPATIENT)
Dept: OBGYN | Facility: CLINIC | Age: 48
End: 2023-06-27
Payer: COMMERCIAL

## 2023-06-27 VITALS
BODY MASS INDEX: 37.96 KG/M2 | HEART RATE: 78 BPM | SYSTOLIC BLOOD PRESSURE: 137 MMHG | WEIGHT: 239 LBS | HEIGHT: 66.5 IN | OXYGEN SATURATION: 97 % | DIASTOLIC BLOOD PRESSURE: 89 MMHG

## 2023-06-27 DIAGNOSIS — N83.6 HEMATOSALPINX: ICD-10-CM

## 2023-06-27 DIAGNOSIS — Z76.89 PERSONS ENCOUNTERING HEALTH SERVICES IN OTHER SPECIFIED CIRCUMSTANCES: ICD-10-CM

## 2023-06-27 LAB
BILIRUB UR QL STRIP: NORMAL
CLARITY UR: NORMAL
COLLECTION METHOD: NORMAL
GLUCOSE UR-MCNC: NORMAL
HCG UR QL: 1 EU/DL
HGB UR QL STRIP.AUTO: NORMAL
KETONES UR-MCNC: NORMAL
LEUKOCYTE ESTERASE UR QL STRIP: NORMAL
NITRITE UR QL STRIP: NORMAL
PH UR STRIP: 7
PROT UR STRIP-MCNC: NORMAL
SP GR UR STRIP: 1.02

## 2023-06-27 PROCEDURE — 99204 OFFICE O/P NEW MOD 45 MIN: CPT

## 2023-06-27 PROCEDURE — 81003 URINALYSIS AUTO W/O SCOPE: CPT | Mod: QW

## 2023-06-27 RX ORDER — NORETHINDRONE ACETATE AND ETHINYL ESTRADIOL, AND FERROUS FUMARATE 1MG-20(24)
KIT ORAL
Refills: 0 | Status: DISCONTINUED | COMMUNITY
End: 2023-06-27

## 2023-06-27 RX ORDER — AMOXICILLIN AND CLAVULANATE POTASSIUM 875; 125 MG/1; MG/1
875-125 TABLET, COATED ORAL
Qty: 20 | Refills: 0 | Status: DISCONTINUED | COMMUNITY
Start: 2019-03-21 | End: 2023-06-27

## 2023-06-27 NOTE — DISCUSSION/SUMMARY
[FreeTextEntry1] : I reviewed with Jayda her GYN history.\par I reviewed with her also her family history in terms of cancer.  She has a strong history of breast and ovarian cancer in the family.  She is planned for genetic counseling and testing in October 2023.\par She has an appointment with Dr. Roberts for follow-up on breast screening.\par She has a colonoscopy scheduled next month.\par She raised some concerns about menopause.  I discussed with her that she may be perimenopausal but she does not report any severe symptoms such as severe hot flashes with sweating, severe night sweats.  I discussed with her that I would recommend doing a symptoms diary to assess her symptoms, frequency, severity.  I discussed with her the benefits of hormone therapy should we at any point decide that it is an appropriate management for her.  We discussed cardiac health benefits, bone health benefits.  I discussed with her the definition of menopause which is 1 full year without a period.  She reports that she had testing done that hint towards perimenopause, but does not know the exact test.  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

## 2023-06-27 NOTE — PHYSICAL EXAM
[Chaperone Present] : A chaperone was present in the examining room during all aspects of the physical examination [FreeTextEntry1] : Yvrose aguilar [Appropriately responsive] : appropriately responsive [Alert] : alert [No Acute Distress] : no acute distress [Soft] : soft [Non-tender] : non-tender [Non-distended] : non-distended [Oriented x3] : oriented x3 [Labia Majora] : normal [Labia Minora] : normal [Normal] : normal [Uterine Adnexae] : absent

## 2023-06-27 NOTE — HISTORY OF PRESENT ILLNESS
[Patient reported mammogram was normal] : Patient reported mammogram was normal [Patient reported breast sonogram was normal] : Patient reported breast sonogram was normal [Patient reported PAP Smear was normal] : Patient reported PAP Smear was normal [LMP unknown] : LMP unknown [Irregular Cycle Intervals] : are  irregular [N] : Patient does not use contraception [Monogamous (Male Partner)] : is monogamous with a male partner [Y] : Positive pregnancy history [___] : #1 ([unfilled]): [Pregnancy History] : boy [Menarche Age: ____] : age at menarche was [unfilled] [No] : Patient does not have concerns regarding sex [Currently Active] : currently active [Men] : men [Vaginal] : vaginal [FreeTextEntry1] : Tatyana is a 47-year-old coming in to establish care.\par She was previously seen by Dr. Davison for pelvic pain, follow-up\par Left ovary and  of 58.\par History of exploratory laparotomy with right oophorectomy for ovarian cyst in , at that time diagnosis of endometriosis.\par MRI was done and consistent with hematosalpinx and deep endometriosis.  LUX was normal.\par Also history of elevated Tyrer-Cuzick.  In  she had a breast biopsy that was benign.  She was followed by Dr. alicea, last visit was in 2019.\par Family history significant for maternal breast cancer at age 44, maternal aunt with breast cancer at age 40 and sister with ovarian cancer at age 19.  The patient had BRCA testing and was found to be negative.\par GYN Sonogram follow-up in  by her primary GYN showed a 5.2 x 4.9 x 3.1 cm left ovary with a multiseptated Cyst measuring up to 5.1 cm.  Ultrasound done in  showed a much smaller left ovary with left cyst measuring 1.6 x 1.7 x 0.8 cm the tube on the left measured 3.8 x 3.8 x 1.3 cm.  \par LMP irregular menses. Reports regular menses until the end of . She was started on ELVIS. Currently not on medications. \par FMP age 18\par Last PAP–2022, negative.\par Sexually active, not using contraception.\par No history of STDs. \par G1, P1,  x1. \par Mammogram -11/15/2022–BI-RADS 2.\par Colonoscopy - scheduled for colonoscopy in 2023. \par PMH - HLD, borderline elevated BP\par PSH -cholecystectomy, laparoscopic endometriosis surgery,  exploratory laparotomy with RSO.\par Medications - none\par Allergies -NKDA\par No smoking or drug use, ETOH–occasionally\par Family -mother, aunt and maternal grandmother with breast cancer in their 40s.  Sister with ovarian cancer at age 19, doing well. Niece - breast cancer, age 38. \par No Ashkenazi Buddhism heritage. \par She is scheduled for genetic counseling and testing in 10/2023 [Mammogramdate] : 11/22 [TextBox_19] : outside med records [BreastSonogramDate] : 11/22 [TextBox_25] : outside med records  [PapSmeardate] : 12/22 [TextBox_31] : outside med records [PGxTotal] : 1 [[facility] CorporationxFulerm] : 1 [[facility] Hospital[facility]] : 1

## 2023-06-29 LAB — BACTERIA UR CULT: NORMAL

## 2023-08-01 ENCOUNTER — APPOINTMENT (OUTPATIENT)
Dept: OBGYN | Facility: CLINIC | Age: 48
End: 2023-08-01
Payer: COMMERCIAL

## 2023-08-01 PROCEDURE — 99213 OFFICE O/P EST LOW 20 MIN: CPT | Mod: 95

## 2023-08-01 NOTE — DISCUSSION/SUMMARY
[FreeTextEntry1] : I reviewed the images of the US myself.  I agree with the assessment.  I did not see any finding concerning for hydrosalpinx and the left ovary appeared normal.  I agree that the uterine calcification looks benign in nature.  I reviewed these findings with Tatyana. Discussed keeping the apt. for genetic testing and following up with pelvic sonogram in several months to assess stability of calcification.

## 2023-08-01 NOTE — HISTORY OF PRESENT ILLNESS
[FreeTextEntry1] : Telehealth to review US results. History of ovarian cyst with laparotomy and RSO. History of left ovarian cyst in 2021 that on follow up in 2022 resolved.  Family history of several family members, including her mother, maternal aunt and GM with breast cancer in their 40s as well as a niece with breast cancer at age 38. Her sister was diagnosed with ovarian cancer at age 19.  Tatyana is scheduled for genetic testing  On US uterus 6.3X6X3.5cm, echogenic focus in the uterus 0.5cm, benign appearing in nature, endometrium 4mm, right ovary absent. Left ovary measures 3X2.9X1.7cm, normal.

## 2023-08-01 NOTE — REASON FOR VISIT
[Follow-Up] : a follow-up evaluation of [FreeTextEntry2] : Review results of pelvic US done 07/03/23

## 2023-08-15 ENCOUNTER — TRANSCRIPTION ENCOUNTER (OUTPATIENT)
Age: 48
End: 2023-08-15

## 2023-11-20 ENCOUNTER — NON-APPOINTMENT (OUTPATIENT)
Age: 48
End: 2023-11-20

## 2023-11-20 ENCOUNTER — APPOINTMENT (OUTPATIENT)
Dept: OBGYN | Facility: CLINIC | Age: 48
End: 2023-11-20

## 2024-01-08 ENCOUNTER — APPOINTMENT (OUTPATIENT)
Dept: OBGYN | Facility: CLINIC | Age: 49
End: 2024-01-08
Payer: COMMERCIAL

## 2024-01-08 VITALS
WEIGHT: 232 LBS | BODY MASS INDEX: 37.28 KG/M2 | HEIGHT: 66 IN | DIASTOLIC BLOOD PRESSURE: 82 MMHG | SYSTOLIC BLOOD PRESSURE: 124 MMHG

## 2024-01-08 DIAGNOSIS — Z01.419 ENCOUNTER FOR GYNECOLOGICAL EXAMINATION (GENERAL) (ROUTINE) W/OUT ABNORMAL FINDINGS: ICD-10-CM

## 2024-01-08 LAB
BILIRUB UR QL STRIP: NEGATIVE
CLARITY UR: CLEAR
COLLECTION METHOD: NORMAL
GLUCOSE UR-MCNC: NEGATIVE
HCG UR QL: 1 EU/DL
HCG UR QL: NEGATIVE
HGB UR QL STRIP.AUTO: NEGATIVE
KETONES UR-MCNC: NEGATIVE
LEUKOCYTE ESTERASE UR QL STRIP: NORMAL
NITRITE UR QL STRIP: NEGATIVE
PH UR STRIP: 7
PROT UR STRIP-MCNC: NEGATIVE
SP GR UR STRIP: 1.02

## 2024-01-08 PROCEDURE — 81003 URINALYSIS AUTO W/O SCOPE: CPT | Mod: QW

## 2024-01-08 PROCEDURE — 99213 OFFICE O/P EST LOW 20 MIN: CPT | Mod: 25

## 2024-01-08 PROCEDURE — 81025 URINE PREGNANCY TEST: CPT

## 2024-01-08 PROCEDURE — 99396 PREV VISIT EST AGE 40-64: CPT | Mod: 25

## 2024-01-08 NOTE — DISCUSSION/SUMMARY
[FreeTextEntry1] : Tatyana is coming in to review results and for an annual.  Annual: Exam as above. Pap done.  Pelvic ultrasound: Tatyana has a history of hydrosalpinx/hematosalpinx.  Previous imaging, about 6 months ago, did not show this finding. I reviewed the images of the current pelvic ultrasound myself.  I agree that the left adnexa has a component compatible with hydrosalpinx/hematosalpinx.  This was not visualized in previous ultrasound. When reviewing the previous ultrasound there is an area that may be compatible with hydrosalpinx but it is difficult to determine, as the images are limited. I discussed with her that given the difference between the 2 ultrasounds I would recommend proceeding with a pelvic MRI to establish stability of the finding and compare it to previous MRI. Referral for pelvic MRI was given. All her questions were answered. Follow-up with results of the MRI. I discussed with Ttayana that I will be relocating I discussed continuation of care for the left adnexal finding with either Dr. Bocanegra or Dr. Davison

## 2024-01-08 NOTE — PHYSICAL EXAM
[Chaperone Present] : A chaperone was present in the examining room during all aspects of the physical examination [FreeTextEntry1] :  Martina Stewart [Appropriately responsive] : appropriately responsive [Alert] : alert [No Acute Distress] : no acute distress [Soft] : soft [Non-tender] : non-tender [Non-distended] : non-distended [Oriented x3] : oriented x3 [Examination Of The Breasts] : a normal appearance [No Masses] : no breast masses were palpable [Labia Majora] : normal [Labia Minora] : normal [Normal] : normal [Uterine Adnexae] : normal

## 2024-01-08 NOTE — HISTORY OF PRESENT ILLNESS
[Patient reported PAP Smear was normal] : Patient reported PAP Smear was normal [FreeTextEntry1] : Tatyana is coming in for annual exam and to review imaging results.  LMP 4/2023, prior to that she was on ELVIS.  No personal history of breast cancer, family history significant for several family members with breast cancer. Genetic testing was negative. No new surgeries since August.  History of exploratory laparotomy with right oophorectomy for ovarian cyst in 2015, at that time diagnosis of endometriosis. History of hematosalpinx diagnosed in 2019 that was followed conservatively. US 6 months ago was normal.  Recent US  -uterus 4 x 4.5 x 6.2 cm, endometrium 2 mm, right ovary surgically absent, left ovary measuring 1.8 x 1.9 x 2.5 cm, no ovarian masses or significant cysts, adjacent to the left ovary there is a tubular complex cystic structure noted which is stable and is believed to correspond with hydrosalpinx, it was not seen on prior ultrasound but was seen on MRI in 2019. [PapSmeardate] : 12/22

## 2024-01-09 LAB — HPV HIGH+LOW RISK DNA PNL CVX: NOT DETECTED

## 2024-01-11 LAB — CYTOLOGY CVX/VAG DOC THIN PREP: NORMAL

## 2024-01-24 NOTE — REASON FOR VISIT
[Home] : at home, [unfilled] , at the time of the visit. [Medical Office: ([facility])___] : at the medical office located in  [Self] : self [Follow-Up] : a follow-up evaluation of [FreeTextEntry2] : MRI results

## 2024-01-25 ENCOUNTER — APPOINTMENT (OUTPATIENT)
Dept: OBGYN | Facility: CLINIC | Age: 49
End: 2024-01-25
Payer: COMMERCIAL

## 2024-01-25 DIAGNOSIS — Z71.2 PERSON CONSULTING FOR EXPLANATION OF EXAMINATION OR TEST FINDINGS: ICD-10-CM

## 2024-01-25 DIAGNOSIS — N70.11 CHRONIC SALPINGITIS: ICD-10-CM

## 2024-01-25 PROCEDURE — 99213 OFFICE O/P EST LOW 20 MIN: CPT | Mod: 95

## 2024-02-15 ENCOUNTER — TRANSCRIPTION ENCOUNTER (OUTPATIENT)
Age: 49
End: 2024-02-15

## 2024-08-13 NOTE — ED ADULT NURSE NOTE - CARDIO ASSESSMENT
[Verbal] : verbal [Patient] : patient [Good - alert, interested, motivated] : Good - alert, interested, motivated [Demonstrates independently] : demonstrates independently [FreeTextEntry1] : S/P Hammertoe Correction Left 2nd Toe  -Patient seen and evaluated in clinic today with all questions and concerns addressed and answered.  -Aseptic removal of sutures, WOI. Well epithelialized.  -Patient able to wear normal shoe gear and return to normal activity.  -Patient to follow up in 1 month  ---